# Patient Record
Sex: MALE | Race: WHITE | URBAN - METROPOLITAN AREA
[De-identification: names, ages, dates, MRNs, and addresses within clinical notes are randomized per-mention and may not be internally consistent; named-entity substitution may affect disease eponyms.]

---

## 2017-09-04 ENCOUNTER — HOSPITAL ENCOUNTER (EMERGENCY)
Facility: CLINIC | Age: 27
Discharge: HOME | End: 2017-09-04
Attending: PHYSICIAN ASSISTANT | Admitting: PHYSICIAN ASSISTANT
Payer: COMMERCIAL

## 2017-09-04 ENCOUNTER — APPOINTMENT (OUTPATIENT)
Dept: GENERAL RADIOLOGY | Facility: CLINIC | Age: 27
End: 2017-09-04
Attending: PHYSICIAN ASSISTANT
Payer: COMMERCIAL

## 2017-09-04 VITALS
TEMPERATURE: 98.5 F | DIASTOLIC BLOOD PRESSURE: 87 MMHG | WEIGHT: 185 LBS | RESPIRATION RATE: 14 BRPM | BODY MASS INDEX: 25.06 KG/M2 | HEIGHT: 72 IN | HEART RATE: 72 BPM | OXYGEN SATURATION: 98 % | SYSTOLIC BLOOD PRESSURE: 128 MMHG

## 2017-09-04 DIAGNOSIS — S62.629A AVULSION FRACTURE OF MIDDLE PHALANX OF FINGER, CLOSED, INITIAL ENCOUNTER: ICD-10-CM

## 2017-09-04 PROCEDURE — 99284 EMERGENCY DEPT VISIT MOD MDM: CPT | Mod: 25

## 2017-09-04 PROCEDURE — 26720 TREAT FINGER FRACTURE EACH: CPT | Mod: LT

## 2017-09-04 PROCEDURE — 73140 X-RAY EXAM OF FINGER(S): CPT | Mod: LT

## 2017-09-04 ASSESSMENT — ENCOUNTER SYMPTOMS
COLOR CHANGE: 1
NUMBNESS: 0
WOUND: 0

## 2017-09-04 NOTE — DISCHARGE INSTRUCTIONS
Use Acetaminophen and/or Ibuprofen as needed for pain.      Follow-up with hand orthopedics on Tuesday 9/12.     Return to the ED with worsening pain, numbness, tingling or if you become worse in any way.           Finger Fracture, Closed  You have a broken finger (fracture). This causes local pain, swelling, and bruising. This injury usually takes about 4 to 6 weeks to heal, but can take longer in some cases. Finger injuries are often treated with a splint or cast, or by taping the injured finger to the next one (buddy taping). This protects the injured finger and holds the bone in position while it heals. More serious fractures may need surgery.     If the fingernail has been severely injured, it will probably fall off in 1 to 2 weeks. A new fingernail will usually start to grow back within a month.  Home care  Follow these guidelines when caring for yourself at home:    Keep your hand elevated to reduce pain and swelling. When sitting or lying down keep your arm above the level of your heart. You can do this by placing your arm on a pillow that rests on your chest or on a pillow at your side. This is most important during the first 2 days (48 hours) after the injury.    Put an ice pack on the injured area. Do this for 20 minutes every 1 to 2 hours the first day for pain relief. You can make an ice pack by wrapping a plastic bag of ice cubes in a thin towel. As the ice melts, be careful that the cast or splint doesn t get wet. Continue using the ice pack 3 to 4 times a day until the pain and swelling go away.    Keep the cast or splint completely dry at all times. Bathe with your cast or splint out of the water. Protect it with a large plastic bag, rubber-banded at the top end. If a fiberglass cast or splint gets wet, you can dry it with a hair dryer.    If buddy tape was put on and it becomes wet or dirty, change it. You may replace it with paper, plastic, or cloth tape. Cloth tape and paper tapes must be kept  dry. Keep the hayley tape in place for at least 4 weeks.    You may use acetaminophen or ibuprofen to control pain, unless another pain medicine was prescribed. If you have chronic liver or kidney disease, talk with your healthcare provider before using these medicines. Also talk with your provider if you ve had a stomach ulcer or gastrointestinal bleeding.    Don t put creams or objects under the cast if you have itching.  Follow-up care  Follow up with your healthcare provider, or as advised. This is to make sure the bone is healing the way it should.  X-rays may be taken. You will be told of any new findings that may affect your care.  When to seek medical advice  Call your healthcare provider right away if any of these occur:    The plaster cast or splint becomes wet or soft    The cast or splint cracks    The fiberglass cast or splint stays wet for more than 24 hours    Pain or swelling gets worse    Redness, warmth, swelling, drainage from the wound, or foul odor from a cast or splint    Finger becomes more cold, blue, numb, or tingly    You can t move your finger    The skin around the cast or splint becomes red    Fever of 100.4 F (38 C) or higher, or as directed by your healthcare provider  Date Last Reviewed: 2/1/2017 2000-2017 [employer]. [address] [place] [address], [place] [address]. All rights reserved. This information is not intended as a substitute for professional medical care. Always follow your healthcare professional's instructions.

## 2017-09-04 NOTE — ED AVS SNAPSHOT
Emergency Department    [address] [facility] [address]    Phone:  [phone]    Fax:  [phone]                                       Thomas Williamson   MRN: [id number]    Department:   Emergency Department   Date of Visit:  9/4/2017           Patient Information     Date Of Birth          1990        Your diagnoses for this visit were:     Avulsion fracture of middle phalanx of finger, closed, initial encounter vs distal phalanx       You were seen by Danielle Wright PA-C.      Follow-up Information     Follow up with Ny Shi MD In 1 week.    Specialty:  Orthopedics    Contact information:    [facility]  [address]  [facility] [address]  [phone]          Follow up with  Emergency Department.    Specialty:  EMERGENCY MEDICINE    Why:  If symptoms worsen    Contact information:    [address] [facility] [address] [phone]        Discharge Instructions       Use Acetaminophen and/or Ibuprofen as needed for pain.      Follow-up with hand orthopedics on Tuesday 9/12.     Return to the ED with worsening pain, numbness, tingling or if you become worse in any way.           Finger Fracture, Closed  You have a broken finger (fracture). This causes local pain, swelling, and bruising. This injury usually takes about 4 to 6 weeks to heal, but can take longer in some cases. Finger injuries are often treated with a splint or cast, or by taping the injured finger to the next one (buddy taping). This protects the injured finger and holds the bone in position while it heals. More serious fractures may need surgery.     If the fingernail has been severely injured, it will probably fall off in 1 to 2 weeks. A new fingernail will usually start to grow back within a month.  Home care  Follow these guidelines when caring for yourself at home:    Keep your hand elevated to reduce pain and swelling. When sitting or lying down keep your arm  above the level of your heart. You can do this by placing your arm on a pillow that rests on your chest or on a pillow at your side. This is most important during the first 2 days (48 hours) after the injury.    Put an ice pack on the injured area. Do this for 20 minutes every 1 to 2 hours the first day for pain relief. You can make an ice pack by wrapping a plastic bag of ice cubes in a thin towel. As the ice melts, be careful that the cast or splint doesn t get wet. Continue using the ice pack 3 to 4 times a day until the pain and swelling go away.    Keep the cast or splint completely dry at all times. Bathe with your cast or splint out of the water. Protect it with a large plastic bag, rubber-banded at the top end. If a fiberglass cast or splint gets wet, you can dry it with a hair dryer.    If hayley tape was put on and it becomes wet or dirty, change it. You may replace it with paper, plastic, or cloth tape. Cloth tape and paper tapes must be kept dry. Keep the hayley tape in place for at least 4 weeks.    You may use acetaminophen or ibuprofen to control pain, unless another pain medicine was prescribed. If you have chronic liver or kidney disease, talk with your healthcare provider before using these medicines. Also talk with your provider if you ve had a stomach ulcer or gastrointestinal bleeding.    Don t put creams or objects under the cast if you have itching.  Follow-up care  Follow up with your healthcare provider, or as advised. This is to make sure the bone is healing the way it should.  X-rays may be taken. You will be told of any new findings that may affect your care.  When to seek medical advice  Call your healthcare provider right away if any of these occur:    The plaster cast or splint becomes wet or soft    The cast or splint cracks    The fiberglass cast or splint stays wet for more than 24 hours    Pain or swelling gets worse    Redness, warmth, swelling, drainage from the wound, or foul odor  from a cast or splint    Finger becomes more cold, blue, numb, or tingly    You can t move your finger    The skin around the cast or splint becomes red    Fever of 100.4 F (38 C) or higher, or as directed by your healthcare provider  Date Last Reviewed: 2/1/2017 2000-2017 [employer]. [address] [place] [address]. All rights reserved. This information is not intended as a substitute for professional medical care. Always follow your healthcare professional's instructions.          24 Hour Appointment Hotline       To make an appointment at any [facility], call [phone] ([phone]). If you don't have a family doctor or clinic, we will help you find one. [facility] clinics are conveniently located to serve the needs of you and your family.             Review of your medicines      Notice     You have not been prescribed any medications.            Procedures and tests performed during your visit     XR Finger Left G/E 2 Views      Orders Needing Specimen Collection     None      Pending Results     No orders found from 9/2/2017 to 9/5/2017.            Pending Culture Results     No orders found from 9/2/2017 to 9/5/2017.            Pending Results Instructions     If you had any lab results that were not finalized at the time of your Discharge, you can call the ED Lab Result RN at [phone]. You will be contacted by this team for any positive Lab results or changes in treatment. The nurses are available 7 days a week from 10A to 6:30P.  You can leave a message 24 hours per day and they will return your call.        Test Results From Your Hospital Stay        9/4/2017  1:54 PM      Narrative     LEFT FINGER TWO OR MORE VIEWS   9/4/2017 1:28 PM     HISTORY: Caught in wake board rope, pain and swelling.    COMPARISON: None.        Impression     IMPRESSION: There is a small chip fracture involving the PIP joint of  the third digit with some associated soft tissue swelling.  The small  chip fracture is located along the dorsal aspect of the PIP joint seen  on the lateral.    ORMEO BRASHER MD                Clinical Quality Measure: Blood Pressure Screening     Your blood pressure was checked while you were in the emergency department today. The last reading we obtained was  BP: 138/87 . Please read the guidelines below about what these numbers mean and what you should do about them.  If your systolic blood pressure (the top number) is less than 120 and your diastolic blood pressure (the bottom number) is less than 80, then your blood pressure is normal. There is nothing more that you need to do about it.  If your systolic blood pressure (the top number) is 120-139 or your diastolic blood pressure (the bottom number) is 80-89, your blood pressure may be higher than it should be. You should have your blood pressure rechecked within a year by a primary care provider.  If your systolic blood pressure (the top number) is 140 or greater or your diastolic blood pressure (the bottom number) is 90 or greater, you may have high blood pressure. High blood pressure is treatable, but if left untreated over time it can put you at risk for heart attack, stroke, or kidney failure. You should have your blood pressure rechecked by a primary care provider within the next 4 weeks.  If your provider in the emergency department today gave you specific instructions to follow-up with your doctor or provider even sooner than that, you should follow that instruction and not wait for up to 4 weeks for your follow-up visit.        Thank you for choosing [facility]       Thank you for choosing [facility] for your care. Our goal is always to provide you with excellent care. Hearing back from our patients is one way we can continue to improve our services. Please take a few minutes to complete the written survey that you may receive in the mail after you visit with us. Thank you!        MyChart Information     "[employer]hart  "lets you send messages to your doctor, view your test results, renew your prescriptions, schedule appointments and more. To sign up, go to [email or web address] . Click on \"Log in\" on the left side of the screen, which will take you to the Welcome page. Then click on \"Sign up Now\" on the right side of the page.     You will be asked to enter the access code listed below, as well as some personal information. Please follow the directions to create your username and password.     Your access code is: [email or web address]  Expires: 12/3/2017  2:49 PM     Your access code will  in 90 days. If you need help or a new code, please call your [facility] clinic or [phone].        Care EveryWhere ID     This is your Care EveryWhere ID. This could be used by other organizations to access your [facility] medical records  [id number]        Equal Access to Services     [facility] HospitalSAMANTHA : Hadii william Barrios, wairina solorzano, qadelia trentalmastefan diamond, emily naylor . So [facility] [phone].    ATENCIÓN: Si habla español, tiene a ramirez disposición servicios gratuitos de asistencia lingüística. Llame al [phone].    We comply with applicable federal civil rights laws and [place] laws. We do not discriminate on the basis of race, color, national origin, age, disability sex, sexual orientation or gender identity.            After Visit Summary       This is your record. Keep this with you and show to your community pharmacist(s) and doctor(s) at your next visit.                  "

## 2017-09-04 NOTE — ED AVS SNAPSHOT
Emergency Department    [address] [place] [address]    Phone:  [phone]    Fax:  [phone]                                       Thomas Williamson   MRN: [id number]    Department:   Emergency Department   Date of Visit:  9/4/2017           After Visit Summary Signature Page     I have received my discharge instructions, and my questions have been answered. I have discussed any challenges I see with this plan with the nurse or doctor.    ..........................................................................................................................................  Patient/Patient Representative Signature      ..........................................................................................................................................  Patient Representative Print Name and Relationship to Patient    ..................................................               ................................................  Date                                            Time    ..........................................................................................................................................  Reviewed by Signature/Title    ...................................................              ..............................................  Date                                                            Time

## 2017-09-04 NOTE — ED PROVIDER NOTES
History     Chief Complaint:  Hand Pain     HPI   Thomas Williamson is an otherwise healthy 27 year old male who presents to the Emergency Department for evaluation of finger pain. The patient states he was wakeboarding yesterday afternoon when his left third finger got caught in the rope. Upon presentation he complains of left third finger pain, swelling and bruising as well as difficulty with flexion and extension. He currently rates his pain as an 8/10. The pain does not radiate. The patient denies any loss of sensation, left wrist pain or any other associated injuries.     Allergies:  Septra     Medications:    The patient is not currently taking any prescribed medications.    Past Medical History:    The patient denies any significant past medical history.    Past Surgical History:    The patient does not have any pertinent past surgical history.    Family History:    No past pertinent family history.    Social History:  Smoking Status: never smoker  Smokeless Tobacco: never used  Alcohol Use: yes     Review of Systems   Musculoskeletal:        Positive for left third finger pain/ swelling  No left wrist pain.   Skin: Positive for color change. Negative for wound.   Neurological: Negative for numbness.   All other systems reviewed and are negative.    Physical Exam   First Vitals:  Patient Vitals for the past 24 hrs:   BP Temp Temp src Pulse Heart Rate Resp SpO2 Height Weight   09/04/17 1307 138/87 98.5  F (36.9  C) Oral 72 72 14 98 % 1.829 m (6') 83.9 kg (185 lb)     Physical Exam  General: Resting comfortably on the gurney.    Resp:  Non-labored breathing. No tachypnea.   CV:  Radial pulses 2+ on the left     Capillary refill less than two seconds left fingers, including the left middle finger   MS:  5/5 strength with left middle finger flexion and extension at the MCP, PIP and DIP joints.      Normal ROM with left middle finger flexion and extension at the MCP and DIP joints; but limited with full  extension but approximately 10-15% of flexion maintained.    Diffuse left middle finger tenderness with palpation, most significant over the PIP, with associated swelling and ecchymosis also most significant over the PIP joint. The remainder of the left hand is non-tender with palpation.   Neuro:  Awake and alert.     Sensation intact to light touch in the median, ulnar and radial nerve distributions as well as distal to the injury.    Skin:  Ecchymosis as above, otherwise no abrasions or lacerations   Psych: Normal affect. Appropriate interactions.      Emergency Department Course     Imaging:  Radiographic findings were communicated with the patient who voiced understanding of the findings.    Left finger XR:  There is a small chip fracture involving the PIP joint of  the third digit with some associated soft tissue swelling. The small  chip fracture is located along the dorsal aspect of the PIP joint seen  on the lateral. As per radiology.     Emergency Department Course:  Nursing notes and vitals reviewed. I performed an exam of the patient as documented above.    The patient was sent for a left finger xray while here in the emergency department, findings above.    1355 I reassessed the patient.     1405 I spoke with Dr. Thorne regarding this patient, who examined this patient's hand in detail.    1417 I reassessed the patient.     1432 Alumafoam splint was applied to the left third digit and after placement I checked and adjusted the fit to ensure proper positioning.  The patient was more comfortable with the splint in place.  Sensation and circulation are intact after splint placement.    Findings and plan explained to the Patient. Patient discharged home with instructions regarding supportive care, medications, and reasons to return. The importance of close follow-up was reviewed.     Impression & Plan      Medical Decision Making:  Findings, exam, and radiographic evidence are consistent with finger avulsion  fracture. It is difficult to discern if this is from the middle or proximal phalanx, but suspect middle (dorsal base). He does not have full extension of the finger at the PIP joint, this may be due to swelling, but concerning for possible extensor mechanism injury. Close observation for boutonierre deformity is indicated.  Given this concern finger was splinted in mild hyper-extension, which was difficult given swelling. Sensation intact prior with good cap refill. No evidence of any neurovascular compromise or complete tendon disruption. No sign of dislocation, or need for reduction.  Indications to seek urgent reevaluation were reviewed. Patient will follow-up with orthopedic hand within 1 week as he is out of town the next several days in [place]. We discussed the importance of leaving the splint in place until following up with ortho hand. I discussed the results, plan and any additional questions with the patient. He verbalized understanding and agreement with the plan. 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     I evaluated this patient in shared service with Dr. Thorne.      Diagnosis:    ICD-10-CM    1. Avulsion fracture of middle phalanx of finger, closed, initial encounter S62.629A     vs distal phalanx     Disposition:  discharged to home    I, Suzie Leach, am serving as a scribe on 9/4/2017 at 1:03 PM to personally document services performed by Danielle Wright PA-C based on my observations and the provider's statements to me.   Suzie Leach  9/4/2017    EMERGENCY DEPARTMENT       Danielle Wright PA-C  09/04/17 2227    Emergency Department Attending Supervision Note    9/5/2017  10:09 PM      I evaluated this patient in conjunction with Danielle Wright PA-C    Briefly, the patient presented with finger injury.    My exam:  Finger:  The PIP joint of the affected  finger is very swollen and bruised. There is good flexion via the FDP and FDS.  The extension at the PIP is slightly deficient, perhaps 10-15 degrees shy of horizontal.  This may be due to swelling, but extensor mechanism injury is possible, given the avulsion fracture at the dorsal PIP joint.  Boutonierre deformity can occur with these injuries, so close follow up is indicated with Hand Surgery.  He will be splinted at this time as noted above.      My impression/diagnosis is:    Fracture phalanx.  Slight deficit with complete Extension, which may indicate tendon injury.    Harshal Thorne MD  Emergency Physician               Harshal Thorne MD  09/05/17 5315